# Patient Record
Sex: MALE | Race: WHITE | Employment: FULL TIME | URBAN - NONMETROPOLITAN AREA
[De-identification: names, ages, dates, MRNs, and addresses within clinical notes are randomized per-mention and may not be internally consistent; named-entity substitution may affect disease eponyms.]

---

## 2020-08-12 ENCOUNTER — APPOINTMENT (OUTPATIENT)
Dept: GENERAL RADIOLOGY | Age: 45
End: 2020-08-12
Payer: COMMERCIAL

## 2020-08-12 ENCOUNTER — HOSPITAL ENCOUNTER (EMERGENCY)
Age: 45
Discharge: HOME | End: 2020-08-13
Attending: INTERNAL MEDICINE
Payer: COMMERCIAL

## 2020-08-12 LAB
ABSOLUTE EOS #: 0.3 K/UL (ref 0–0.4)
ABSOLUTE IMMATURE GRANULOCYTE: NORMAL K/UL (ref 0–0.3)
ABSOLUTE LYMPH #: 3.6 K/UL (ref 1–4.8)
ABSOLUTE MONO #: 0.8 K/UL (ref 0–1)
ANION GAP SERPL CALCULATED.3IONS-SCNC: 17 MMOL/L (ref 9–17)
BASOPHILS # BLD: 0 % (ref 0–2)
BASOPHILS ABSOLUTE: 0 K/UL (ref 0–0.2)
BUN BLDV-MCNC: 13 MG/DL (ref 6–20)
BUN/CREAT BLD: 10 (ref 9–20)
CALCIUM SERPL-MCNC: 10.5 MG/DL (ref 8.6–10.4)
CHLORIDE BLD-SCNC: 99 MMOL/L (ref 98–107)
CO2: 22 MMOL/L (ref 20–31)
CREAT SERPL-MCNC: 1.3 MG/DL (ref 0.7–1.2)
DIFFERENTIAL TYPE: YES
EOSINOPHILS RELATIVE PERCENT: 3 % (ref 0–5)
GFR AFRICAN AMERICAN: >60 ML/MIN
GFR NON-AFRICAN AMERICAN: >60 ML/MIN
GFR SERPL CREATININE-BSD FRML MDRD: ABNORMAL ML/MIN/{1.73_M2}
GFR SERPL CREATININE-BSD FRML MDRD: ABNORMAL ML/MIN/{1.73_M2}
GLUCOSE BLD-MCNC: 138 MG/DL (ref 70–99)
HCT VFR BLD CALC: 41.4 % (ref 41–53)
HEMOGLOBIN: 13.5 G/DL (ref 13.5–17.5)
IMMATURE GRANULOCYTES: NORMAL %
LYMPHOCYTES # BLD: 39 % (ref 13–44)
MAGNESIUM: 2 MG/DL (ref 1.6–2.6)
MCH RBC QN AUTO: 26.2 PG (ref 26–34)
MCHC RBC AUTO-ENTMCNC: 32.6 G/DL (ref 31–37)
MCV RBC AUTO: 80.3 FL (ref 80–100)
MONOCYTES # BLD: 9 % (ref 5–9)
NRBC AUTOMATED: NORMAL PER 100 WBC
PDW BLD-RTO: 13.4 % (ref 12.1–15.2)
PLATELET # BLD: 272 K/UL (ref 140–450)
PLATELET ESTIMATE: NORMAL
PMV BLD AUTO: NORMAL FL (ref 6–12)
POTASSIUM SERPL-SCNC: 3.1 MMOL/L (ref 3.7–5.3)
RBC # BLD: 5.15 M/UL (ref 4.5–5.9)
RBC # BLD: NORMAL 10*6/UL
SEG NEUTROPHILS: 49 % (ref 39–75)
SEGMENTED NEUTROPHILS ABSOLUTE COUNT: 4.5 K/UL (ref 2.1–6.5)
SODIUM BLD-SCNC: 138 MMOL/L (ref 135–144)
WBC # BLD: 9.3 K/UL (ref 3.5–11)
WBC # BLD: NORMAL 10*3/UL

## 2020-08-12 PROCEDURE — 85025 COMPLETE CBC W/AUTO DIFF WBC: CPT

## 2020-08-12 PROCEDURE — 80048 BASIC METABOLIC PNL TOTAL CA: CPT

## 2020-08-12 PROCEDURE — 94770 HC ETCO2 MONITOR DAILY: CPT

## 2020-08-12 PROCEDURE — 99284 EMERGENCY DEPT VISIT MOD MDM: CPT

## 2020-08-12 PROCEDURE — 6360000002 HC RX W HCPCS

## 2020-08-12 PROCEDURE — 6360000002 HC RX W HCPCS: Performed by: INTERNAL MEDICINE

## 2020-08-12 PROCEDURE — 96375 TX/PRO/DX INJ NEW DRUG ADDON: CPT

## 2020-08-12 PROCEDURE — 23650 CLTX SHO DSLC W/MNPJ WO ANES: CPT

## 2020-08-12 PROCEDURE — 2580000003 HC RX 258: Performed by: INTERNAL MEDICINE

## 2020-08-12 PROCEDURE — 73030 X-RAY EXAM OF SHOULDER: CPT

## 2020-08-12 PROCEDURE — 83735 ASSAY OF MAGNESIUM: CPT

## 2020-08-12 PROCEDURE — 96374 THER/PROPH/DIAG INJ IV PUSH: CPT

## 2020-08-12 RX ORDER — FENTANYL CITRATE 50 UG/ML
300 INJECTION, SOLUTION INTRAMUSCULAR; INTRAVENOUS ONCE
Status: COMPLETED | OUTPATIENT
Start: 2020-08-12 | End: 2020-08-12

## 2020-08-12 RX ORDER — MIDAZOLAM HYDROCHLORIDE 1 MG/ML
2 INJECTION INTRAMUSCULAR; INTRAVENOUS ONCE
Status: COMPLETED | OUTPATIENT
Start: 2020-08-12 | End: 2020-08-12

## 2020-08-12 RX ORDER — FLUMAZENIL 0.1 MG/ML
INJECTION, SOLUTION INTRAVENOUS
Status: DISCONTINUED
Start: 2020-08-12 | End: 2020-08-12 | Stop reason: WASHOUT

## 2020-08-12 RX ORDER — MIDAZOLAM HYDROCHLORIDE 2 MG/2ML
INJECTION, SOLUTION INTRAMUSCULAR; INTRAVENOUS
Status: COMPLETED
Start: 2020-08-12 | End: 2020-08-12

## 2020-08-12 RX ORDER — ONDANSETRON 2 MG/ML
4 INJECTION INTRAMUSCULAR; INTRAVENOUS ONCE
Status: COMPLETED | OUTPATIENT
Start: 2020-08-12 | End: 2020-08-12

## 2020-08-12 RX ORDER — 0.9 % SODIUM CHLORIDE 0.9 %
1000 INTRAVENOUS SOLUTION INTRAVENOUS ONCE
Status: COMPLETED | OUTPATIENT
Start: 2020-08-12 | End: 2020-08-12

## 2020-08-12 RX ADMIN — FENTANYL CITRATE 100 MCG: 50 INJECTION INTRAMUSCULAR; INTRAVENOUS at 23:12

## 2020-08-12 RX ADMIN — MIDAZOLAM HYDROCHLORIDE 1 MG: 2 INJECTION, SOLUTION INTRAMUSCULAR; INTRAVENOUS at 23:12

## 2020-08-12 RX ADMIN — ONDANSETRON 4 MG: 2 INJECTION INTRAMUSCULAR; INTRAVENOUS at 21:51

## 2020-08-12 RX ADMIN — HYDROMORPHONE HYDROCHLORIDE 1 MG: 1 INJECTION, SOLUTION INTRAMUSCULAR; INTRAVENOUS; SUBCUTANEOUS at 21:51

## 2020-08-12 RX ADMIN — MIDAZOLAM HYDROCHLORIDE 1 MG: 2 INJECTION, SOLUTION INTRAMUSCULAR; INTRAVENOUS at 23:09

## 2020-08-12 RX ADMIN — SODIUM CHLORIDE 1000 ML: 9 INJECTION, SOLUTION INTRAVENOUS at 22:45

## 2020-08-12 ASSESSMENT — PAIN SCALES - GENERAL
PAINLEVEL_OUTOF10: 3
PAINLEVEL_OUTOF10: 8
PAINLEVEL_OUTOF10: 10
PAINLEVEL_OUTOF10: 6
PAINLEVEL_OUTOF10: 7
PAINLEVEL_OUTOF10: 7
PAINLEVEL_OUTOF10: 3

## 2020-08-12 ASSESSMENT — PAIN DESCRIPTION - LOCATION: LOCATION: SHOULDER

## 2020-08-12 ASSESSMENT — PAIN DESCRIPTION - DESCRIPTORS: DESCRIPTORS: CONSTANT;THROBBING

## 2020-08-12 ASSESSMENT — PAIN DESCRIPTION - PAIN TYPE: TYPE: ACUTE PAIN

## 2020-08-12 ASSESSMENT — PAIN DESCRIPTION - ORIENTATION: ORIENTATION: LEFT

## 2020-08-12 NOTE — LETTER
[facility] ED  [address] [place], [address]  Phone: [phone]               August 13, 2020    Patient: Jazzmine De La Fuente   YOB: 1975   Date of Visit: 8/12/2020       To Whom It May Concern:    Mc Ayers was seen and treated in our emergency department on 8/12/2020. He may return to work on 08/17/2020.       Sincerely,       Leonor Lockhart RN         Signature:__________________________________

## 2020-08-13 VITALS
WEIGHT: 204.7 LBS | OXYGEN SATURATION: 99 % | TEMPERATURE: 97.9 F | SYSTOLIC BLOOD PRESSURE: 116 MMHG | HEART RATE: 73 BPM | HEIGHT: 71 IN | DIASTOLIC BLOOD PRESSURE: 82 MMHG | RESPIRATION RATE: 12 BRPM | BODY MASS INDEX: 28.66 KG/M2

## 2020-08-13 PROCEDURE — 90471 IMMUNIZATION ADMIN: CPT | Performed by: INTERNAL MEDICINE

## 2020-08-13 PROCEDURE — 6360000002 HC RX W HCPCS: Performed by: INTERNAL MEDICINE

## 2020-08-13 PROCEDURE — 90715 TDAP VACCINE 7 YRS/> IM: CPT | Performed by: INTERNAL MEDICINE

## 2020-08-13 RX ORDER — TRAMADOL HYDROCHLORIDE 50 MG/1
50 TABLET ORAL EVERY 8 HOURS PRN
Qty: 8 TABLET | Refills: 0 | Status: SHIPPED | OUTPATIENT
Start: 2020-08-13 | End: 2020-08-16

## 2020-08-13 RX ORDER — IBUPROFEN 600 MG/1
600 TABLET ORAL 4 TIMES DAILY PRN
Qty: 40 TABLET | Refills: 0 | Status: SHIPPED | OUTPATIENT
Start: 2020-08-13

## 2020-08-13 RX ADMIN — TETANUS TOXOID, REDUCED DIPHTHERIA TOXOID AND ACELLULAR PERTUSSIS VACCINE, ADSORBED 0.5 ML: 5; 2.5; 8; 8; 2.5 SUSPENSION INTRAMUSCULAR at 00:21

## 2020-08-13 NOTE — ED PROVIDER NOTES
[facility] ED  EMERGENCY DEPARTMENT ENCOUNTER      Pt Name: Radha Wheat  MRN: [id number]  Birthdate 1975  Date of evaluation: 8/12/2020  Provider: Helen Verma MD    CHIEF COMPLAINT       Chief Complaint   Patient presents with    Shoulder Injury     fell off skateboard 30 min ago and is now having 8/10 pain         HISTORY OF PRESENT ILLNESS   (Location/Symptom, Timing/Onset, Context/Setting, Quality, Duration, Modifying Factors, Severity)  Note limiting factors. Radha Wheat is a 39 y.o. male who presents to the emergency department for evaluation and management of left shoulder pain after falling off a skateboard about 30 minutes ago. He is unable to move his left shoulder. He has not tried anything for symptoms. He has not seen any other providers for this. This patient is being evaluated during the COVID-19 pandemic. HPI    Nursing Notes were reviewed. REVIEW OF SYSTEMS    (2-9 systems for level 4, 10 or more for level 5)       REVIEW OF SYSTEMS    Constitutional: Negative for fatigue and fever. Respiratory: Negative for cough, chest tightness and shortness of breath. Cardiovascular: Negative for chest pain, palpitations and leg swelling. Musculoskeletal: Positive for left shoulder pain and deformity, negative for arthralgias, back pain and neck pain. Skin: Negative for wound, laceration, color change, pallor, rash   Neurological: Negative for dizziness, speech difficulty, weakness, distal tingling/numbness and headaches. All other systems reviewed and are negative. Except as noted above the remainder of the review of systems was reviewed and negative. PASTMEDICAL HISTORY   History reviewed. No pertinent past medical history.       SURGICAL HISTORY       Past Surgical History:   Procedure Laterality Date    BACK SURGERY  9/17/2012    fusion    HAND SURGERY Right 1995         CURRENT MEDICATIONS       Discharge Medication List as of 8/13/2020 12:43 AM      CONTINUE these medications which have NOT CHANGED    Details   acetaminophen (TYLENOL) 325 MG tablet Take 650 mg by mouth every 6 hours as needed for Pain. diphenhydrAMINE (BENADRYL) 25 MG tablet Take 50 mg by mouth every 6 hours as needed for Itching. ALLERGIES     Soybean-containing drug products    FAMILY HISTORY     History reviewed. No pertinent family history.        SOCIAL HISTORY       Social History     Socioeconomic History    Marital status:      Spouse name: None    Number of children: None    Years of education: None    Highest education level: None   Occupational History    None   Social Needs    Financial resource strain: None    Food insecurity     Worry: None     Inability: None    Transportation needs     Medical: None     Non-medical: None   Tobacco Use    Smoking status: Former Smoker    Smokeless tobacco: Never Used   Substance and Sexual Activity    Alcohol use: No    Drug use: Never    Sexual activity: Never   Lifestyle    Physical activity     Days per week: None     Minutes per session: None    Stress: None   Relationships    Social connections     Talks on phone: None     Gets together: None     Attends Orthodoxy service: None     Active member of club or organization: None     Attends meetings of clubs or organizations: None     Relationship status: None    Intimate partner violence     Fear of current or ex partner: None     Emotionally abused: None     Physically abused: None     Forced sexual activity: None   Other Topics Concern    None   Social History Narrative    None       SCREENINGS    [place] Coma Scale  Eye Opening: Spontaneous  Best Verbal Response: Oriented  Best Motor Response: Obeys commands  [place] Coma Scale Score: 15        PHYSICAL EXAM    (up to 7 for level 4, 8 or more for level 5)     ED Triage Vitals [08/12/20 2115]   BP Temp Temp src Pulse Resp SpO2 Height Weight   136/87 97.9 °F (36.6 °C) -- 83 16 100 % -- 204 lb 11.2 oz (92.9 kg)       Physical Exam  Physical Exam   Constitutional:  Appears well, well-developed and well-nourished. No distress noted. Non toxic in appearance. Patient in pain when shoulder is manipulated. HENT:     Head: Normocephalic and atraumatic. Mouth/Throat: Oropharynx is clear and mucosa moist. No oropharyngeal exudate noted. Posterior pharynx is pink and noninjected. Eyes: Conjunctivae and EOM are normal. Pupils are equal, round, and reactive to light. No scleral icterus. No tearing or drainage. Neck: Normal range of motion. Neck supple. No tracheal deviation present. No spinous tenderness or step-offs identified on palpation. There is no paraspinous tenderness. No neck pain with axial loading. Cardiovascular: Normal rate, regular rhythm, normal heart sounds and intact distal pulses including left finger cap refill and radial pulse. Exam reveals no gallop or friction rub. No murmur heard. Pulmonary/Chest: Effort normal and breath sounds are symmetric and normal. No respiratory distress. There are no wheezes, rales or rhonchi. No tenderness is exhibited upon palpation of the chest wall. Abdominal: Soft. Bowel sounds are normal. No distension or no mass exhibitted. There is no tenderness, rebound, rigidity or guarding. Genitourinary:   No CVA tenderness noted on examination. Musculoskeletal: Exam shows deformity of left shoulder c/w with anterior dislocation. Patient unable to perform ROM of left shoulder but normal ROM of left elbow, wrist and hand. Exam of spine shows No spinous tenderness or step-offs identified on palpation. No paraspinous tenderness. Lymphadenopathy:  No cervical adenopathy. Neurological:   Alert and oriented to person, place, and time. Reflexes are normal.  There are no cranial nerve deficits. Normal muscle tone, motor and sensory function exhibited of all extremities except left shoulder. Coordination and gait normal.   Skin: Skin is warm and dry.  No rash noted. No diaphoresis. No erythema. No pallor. Psychiatric:  normal mood and affect. Behavior is normal. Judgment and thought content normal.     DIAGNOSTIC RESULTS     EKG: All EKG's are interpreted by the Emergency Department Physician who either signs or Co-signs this chart in the absence of a cardiologist.    Not indicated. RADIOLOGY:   Non-plain film images such as CT, Ultrasoundand MRI are read by the radiologist. Darby Rosen radiographic images are visualized and preliminarily interpreted by the emergency physician with the below findings:    X-ray left shoulder: Anterior dislocation. No fracture identified  X-ray left shoulder: Post reduction, interval reduction of previous anterior dislocation. No fracture identified. Interpretation per the Radiologist below, if available at the time of this note:    XR SHOULDER LEFT (MIN 2 VIEWS)   Final Result      Reduction of previously noted anterior-inferior left shoulder dislocation. No    fracture. XR SHOULDER LEFT (MIN 2 VIEWS)   Final Result      Anterior glenohumeral dislocation                     ED BEDSIDE ULTRASOUND:   Performed by ED Physician - none    LABS:  Labs Reviewed   BASIC METABOLIC PANEL W/ REFLEX TO MG FOR LOW K - Abnormal; Notable for the following components:       Result Value    Glucose 138 (*)     CREATININE 1.30 (*)     Calcium 10.5 (*)     Potassium 3.1 (*)     All other components within normal limits   CBC WITH AUTO DIFFERENTIAL   MAGNESIUM       All other labs were within normal range or not returned as of this dictation.     EMERGENCY DEPARTMENT COURSE and DIFFERENTIAL DIAGNOSIS/MDM:   Vitals:    Vitals:    08/12/20 2331 08/12/20 2337 08/12/20 2345 08/12/20 2346   BP: 128/81 127/78 116/82 116/82   Pulse: 81 89 73    Resp: 16 16 12    Temp:       SpO2: 100% 100% 99% 99%   Weight:       Height:           Noted    MDM    CRITICAL CARE TIME   Total Critical Care time was 0 minutes    EDCOURSE CONSULTS:  None    PROCEDURES:  Unless otherwise noted below, none     Ortho Injury    Date/Time: 8/13/2020 12:04 AM  Performed by: Carmel Gibbs MD  Authorized by: Carmel Gibbs MD   Consent: Verbal consent obtained. Written consent obtained. Risks and benefits: risks, benefits and alternatives were discussed  Consent given by: patient and spouse  Patient understanding: patient states understanding of the procedure being performed  Patient consent: the patient's understanding of the procedure matches consent given  Procedure consent: procedure consent matches procedure scheduled  Relevant documents: relevant documents present and verified  Test results: test results available and properly labeled  Site marked: the operative site was not marked  Imaging studies: imaging studies available  Required items: required blood products, implants, devices, and special equipment available  Patient identity confirmed: verbally with patient, arm band, provided demographic data and hospital-assigned identification number  Time out: Immediately prior to procedure a \"time out\" was called to verify the correct patient, procedure, equipment, support staff and site/side marked as required. Injury location: shoulder  Location details: left shoulder  Injury type: dislocation  Dislocation type: anterior  Hill-Sachs deformity: no  Chronicity: new  Pre-procedure neurovascular assessment: neurovascularly intact  Pre-procedure distal perfusion: normal  Pre-procedure neurological function: normal  Pre-procedure range of motion: reduced    Anesthesia:  Local anesthesia used: no    Sedation:  Patient sedated: yes  Sedation type: moderate (conscious) sedation  Sedatives: midazolam and see MAR for details  Analgesia: fentanyl and see MAR for details  Sedation start date/time: 8/12/2020 11:00 PM  Sedation end date/time: 8/12/2020 11:25 PM  Vitals: Vital signs were monitored during sedation.     Manipulation performed: yes  Reduction method: traction and counter traction  Reduction successful: yes  X-ray confirmed reduction: yes  Immobilization: sling  Post-procedure neurovascular assessment: post-procedure neurovascularly intact  Post-procedure distal perfusion: normal  Post-procedure neurological function: normal  Post-procedure range of motion: normal  Patient tolerance: Patient tolerated the procedure well with no immediate complications  Comments: [place] Maneuver attempted initially followed by traction/counter-traction            Summation      Jose Maradiaga is a 39 y.o. male presented with anterior left shoulder dislocation which was successfully reduced and immobilized thereafter. He is well, well hydrated, nontoxic, hemodynamically stable, neurologically intact, and satisfactory for discharge for outpatient management. Findings discussed at length with patient. I instructed the patient to followup with orthopedics for evaluation of response to management of acute injury. I instructed the patient to return to the ER if his condition worsens, if there is any concern for altered mental status, difficulty breathing, dehydration or loss of function.         Patient Course:        ED Medicationsadministered this visit:    Medications   ondansetron (ZOFRAN) injection 4 mg (4 mg Intravenous Given 8/12/20 2151)   HYDROmorphone (DILAUDID) injection 1 mg (1 mg Intravenous Given 8/12/20 2151)   0.9 % sodium chloride bolus (0 mLs Intravenous Stopped 8/12/20 2358)   midazolam (VERSED) injection 2 mg (1 mg Intravenous Given 8/12/20 2309)   fentaNYL (SUBLIMAZE) injection 300 mcg (100 mcg Intravenous Given 8/12/20 2312)   midazolam PF (VERSED) 2 MG/2ML injection (1 mg  Given 8/12/20 2312)   Tetanus-Diphth-Acell Pertussis (BOOSTRIX) injection 0.5 mL (0.5 mLs Intramuscular Given 8/13/20 0021)       New Prescriptions from this visit:    Discharge Medication List as of 8/13/2020 12:43 AM      START taking these medications    Details traMADol (ULTRAM) 50 MG tablet Take 1 tablet by mouth every 8 hours as needed for Pain for up to 3 days. Intended supply: 3 days. Take lowest dose possible to manage breakthrough pain, Disp-8 tablet,R-0Print      ibuprofen (ADVIL;MOTRIN) 600 MG tablet Take 1 tablet by mouth 4 times daily as needed for Pain, Disp-40 tablet,R-0Print             Follow-up:  Rich Immanuel  [address] [place] [address],[address] [facility]  [phone]    Schedule an appointment as soon as possible for a visit in 1 week      [facility]  [address] [facility]  [phone]  Go to   As needed, If symptoms worsen    Karen De Los Santos MD  [address] [facility]  [phone]    Schedule an appointment as soon as possible for a visit in 2 days  call in the morning for Friday appointment        Final Impression:   1. Anterior dislocation of left shoulder, initial encounter    2. Fall, initial encounter    3. Need for Tdap vaccination    4. Abrasion of forearm without infection               (Please note that portions of this note werecompleted with a voice recognition program.  Efforts were made to edit the dictations but occasionally words are mis-transcribed.)    FINAL IMPRESSION      1. Anterior dislocation of left shoulder, initial encounter    2. Fall, initial encounter    3. Need for Tdap vaccination    4.  Abrasion of forearm without infection          DISPOSITION/PLAN   DISPOSITION        PATIENT REFERRED TO:  Alok Gambino  [address] [facility]  [phone]    Schedule an appointment as soon as possible for a visit in 1 week      [facility]  [address] [address] [phone]  Go to   As needed, If symptoms worsen    Karen De Los Santos MD  [address] [place] [address] [phone]    Schedule an appointment as soon as possible for a visit in 2 days  call in the morning for Friday appointment      DISCHARGE

## 2020-08-13 NOTE — ED NOTES
Respiratory therapist at bedside     Rupert Bills [facility]  08/12/20 8299

## 2020-08-13 NOTE — ED NOTES
Patient consented to a procedure to reduce the left shoulder and receive conscious sedation. Patient's spouse (Candice Jimenez) notified and consented to procedure as well.      Raquel Mao RN  08/12/20 9241

## 2020-09-08 ENCOUNTER — HOSPITAL ENCOUNTER (OUTPATIENT)
Dept: MRI IMAGING | Age: 45
Discharge: HOME | End: 2020-09-10
Payer: COMMERCIAL

## 2020-09-08 PROCEDURE — 73221 MRI JOINT UPR EXTREM W/O DYE: CPT

## 2020-09-21 ENCOUNTER — HOSPITAL ENCOUNTER (OUTPATIENT)
Dept: PHYSICAL THERAPY | Age: 45
Setting detail: THERAPIES SERIES
Discharge: HOME | End: 2020-09-21
Payer: COMMERCIAL

## 2020-09-21 PROCEDURE — 97161 PT EVAL LOW COMPLEX 20 MIN: CPT

## 2020-09-21 ASSESSMENT — PAIN SCALES - GENERAL: PAINLEVEL_OUTOF10: 2

## 2020-09-21 NOTE — PLAN OF CARE
[facility] JULIANNE ROSA       Phone: [phone]   Date: 2020                      Outpatient Physical Therapy  Fax: [phone]    ACCT #: [de-identified]                     Plan of Care  [facility]#: [id number]  Patient: José Miguel Stephen  : 1975    Referring Practitioner: Dr. Jaimie Love    Referral Date : 20    Diagnosis: Left shoulder dislocation  Onset Date: 20  Treatment Diagnosis: Left shoulder dislocation      Assessment: Patient fell sustaining left shoulder dislocation and Bankart fracture/Hillsach lesion. He would benefit from short term therapy to educate and proges with scapular and rotator/GH strengthening to return to fucntional activities and prevent dislocation  Prognosis: Good    Treatment Plan :  Days: 2 times per week Weeks: 6 weeks Total # of Visits Approved: 30    Patient Education/HEP and Therapeutic Exercise     Goals: Time Frame for Short term goals: 6 visits  Short term goal 1: Educate on home program for strengthening of scapular musc and GH musc     Time Frame for Long term goals : 12 visits  Long term goal 1: AROM WNL to complete daily household and work tasks reaching overhead  Long term goal 2: Strength of [facility] and rotator cuff musc 4+-5/5 to complete lifitng up to 40# for work duties  Long term goal 3: Fucntional IR to reach behind back and complete self-care/dressing without discomfort     GINGER KIMBALL, PT   Date: 2020    ______________________________________ Date: 2020  Physician Signature  By signing above or cosigning electronically, I have reviewed this Plan of Care and certify a need for medically necessary rehabilitation services.

## 2020-09-21 NOTE — PROGRESS NOTES
Phone: [address] [place]         Fax: [phone]                      Outpatient Physical Therapy                                                                      Evaluation    Date: 2020  Patient: Kim Alcazar  : 1975  ACCT #: [de-identified]    Referring Practitioner: Dr. Juan Barnes    Referral Date : 20    Diagnosis: Left shoulder dislocation    Treatment Diagnosis: Left shoulder dislocation  Onset Date: 20  PT Insurance Information: CRS  Total # of Visits Approved: 30 Per Physician Order  Total # of Visits to Date: 1  No Show: 0  Canceled Appointment: 0     Subjective ; Goes by Glass     Additional Pertinent Hx: Patient reports falling off skateboard landing on outstretches left UE. He sustained an anterior dislocation with Bankart fracture and Hillsachs lesion. No surgery warrented. Patient placed in sling x 3-4 weeks; he has returned to light duty work. He reports minimal pain and uses Tylenol 1-2 per day. Return MD in 1 month. PMHx includes L5/S1 fusion. 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; 9 still at home    Objective  Vision  Vision: Within Functional Limits  Hearing  Hearing: Within functional limits  Observation/Palpation  Posture: Fair(mild forward shoulder)  Palpation: Mild tenderness     Strength LUE  Strength LUE: Exception  L Shoulder Flexion: 4/5  L Shoulder ABduction: 4/5  L Shoulder External Rotation: 4/5    AROM LUE (degrees)  LUE AROM : Exceptions  L Shoulder Flexion 0-180: 150 deg  L Shoulder ABduction 0-180: 150 deg  L Shoulder Int Rotation  0-70:  Fucntionally able to reach belt line with mild discomfort  L Shoulder Ext Rotation  0-90: Fucntionally able to reach base of head     PROM LUE (degrees)  LUE PROM: Exceptions  L Shoulder Flex  0-170: 155 deg  L Shoulder Ext  0-45: 150 deg  L Shoulder Int Rotation  0-70: 55 deg  L Shoulder Ext Rotation  0-90: 60 deg                                     Assessment  Assessment: Patient fell sustaining left shoulder dislocation and Bankart fracture/Hillsach lesion.    He would benefit from short term therapy to educate and proges with scapular and rotator/GH strengthening to return to fucntional activities and prevent dislocation  Prognosis: Good  Decision Making: Low Complexity  Exam: UEFS = 69/80    Clinical Presentation:  Stable/Uncomplicated  The Following Comorbities will impact the patients progression and Plan of Care:   Previous Orthopedic Injury/Surgery       Activity Tolerance: Patient Tolerated treatment well    Education: PT POC; precautions;   HEP of tband          Goals  Short term goals  Time Frame for Short term goals: 6 visits  Short term goal 1: Educate on home program for strengthening of scapular musc and GH musc     Long term goals  Time Frame for Long term goals : 12 visits  Long term goal 1: AROM WNL to complete daily household and work tasks reaching overhead  Long term goal 2: Strength of GH and rotator cuff musc 4+-5/5 to complete lifitng up to 40# for work duties  Long term goal 3: Fucntional IR to reach behind back and complete self-care/dressing without discomfort    Patient's Goal:    Get back to [facility]    Timed Code Treatment Minutes: 0 Minutes  Total Treatment Time: 50     Time In: 1115  Time Out: 80    [address] [facility], PT Date: 9/21/2020

## 2020-09-25 ENCOUNTER — HOSPITAL ENCOUNTER (OUTPATIENT)
Dept: PHYSICAL THERAPY | Age: 45
Setting detail: THERAPIES SERIES
Discharge: HOME | End: 2020-09-25
Payer: COMMERCIAL

## 2020-09-25 PROCEDURE — 97110 THERAPEUTIC EXERCISES: CPT

## 2020-09-25 NOTE — PROGRESS NOTES
Phone: [address]      Fax: [phone]                            Outpatient Physical Therapy                                                                            Daily Note    Date: 2020  Patient Name: Ender Diamond        MRN: [id number]   ACCT#:  [de-identified]  : 1975  (39 y.o.)    Referring Practitioner: Dr. Armaan Quijano    Referral Date : 20    Diagnosis: Left shoulder dislocation  Treatment Diagnosis: Left shoulder dislocation    Onset Date: 20  PT Insurance Information: CRS  Total # of Visits Approved: 30 Per Physician Order  Total # of Visits to Date: 2  No Show: 0  Canceled Appointment: 0  Plan of Care/Certification Expiration Date: 20    Pre-Treatment Pain:  0/10     Assessment  Assessment: Pt is compliant with HEP. Had mild soreness after initial visit. Initiated ex as outlined with estephania robbins. Will cont per plan.    Chart Reviewed: Yes    Plan  Plan: Continue with current plan    Exercises/Modalities/Manual:  See DocFlow Sheet              Goals  (Total # of Visits to Date: 2)   Short Term Goals - Time Frame for Short term goals: 6 visits  Short term goal 1: Educate on home program for strengthening of scapular musc and [address] musc           Long Term Goals - Time Frame for Long term goals : 12 visits  Long term goal 1: AROM WNL to complete daily household and work tasks reaching overhead  Long term goal 2: Strength of [address] and rotator cuff musc 4+-5/5 to complete lifitng up to 40# for work duties  Long term goal 3: Fucntional IR to reach behind back and complete self-care/dressing without discomfort          Post Treatment Pain:  0/10    Time In: 0730    Time Out : 0705        Timed and total 35 min    Vidal Hernandez   PTA  Date: 2020

## 2020-09-29 ENCOUNTER — HOSPITAL ENCOUNTER (OUTPATIENT)
Dept: PHYSICAL THERAPY | Age: 45
Setting detail: THERAPIES SERIES
Discharge: HOME | End: 2020-09-29
Payer: COMMERCIAL

## 2020-09-29 PROCEDURE — 97110 THERAPEUTIC EXERCISES: CPT

## 2020-09-29 NOTE — PROGRESS NOTES
Phone: [address]      Fax: [phone]                            Outpatient Physical Therapy                                                                            Daily Note    Date: 2020  Patient Name: Kim Alcazar        MRN: [id number]   ACCT#:  [de-identified]  : 1975  (39 y.o.)    Referring Practitioner: Dr. Juan Barnes    Referral Date : 20    Diagnosis: Left shoulder dislocation  Treatment Diagnosis: Left shoulder dislocation    Onset Date: 20  PT Insurance Information: CRS  Total # of Visits Approved: 30 Per Physician Order  Total # of Visits to Date: 3  No Show: 0  Canceled Appointment: 0  Plan of Care/Certification Expiration Date: 20    Pre-Treatment Pain:  0-1/10     Assessment  Assessment: Patient notes generalized fatigue with daily activities as well as with exercise. Difficulty with sidelying ER and hor abd. Educated to increase reps with HEP for endurance.    Continue to progress with scapular and [address] strengthening advancing to plyometric throws  Chart Reviewed: Yes    Plan  Plan: Continue with current plan    Exercises/Modalities/Manual:  See DocFlow Sheet    Education: Increase reps with HEP          Goals  (Total # of Visits to Date: 3)   Short Term Goals - Time Frame for Short term goals: 6 visits  Short term goal 1: Educate on home program for strengthening of scapular musc and GH musc - MET                Long Term Goals - Time Frame for Long term goals : 12 visits  Long term goal 1: AROM WNL to complete daily household and work tasks reaching overhead  Long term goal 2: Strength of [address] and rotator cuff musc 4+-5/5 to complete lifitng up to 40# for work duties  Long term goal 3: Fucntional IR to reach behind back and complete self-care/dressing without discomfort          Post Treatment Pain:  1-2/10    Time In: 0800    Time Out : 0838        Timed Code Treatment Minutes: 35 Minutes  Total Treatment Time: 38 Freddie KIMBALL, PT     Date: 9/29/2020

## 2020-10-02 ENCOUNTER — HOSPITAL ENCOUNTER (OUTPATIENT)
Dept: PHYSICAL THERAPY | Age: 45
Setting detail: THERAPIES SERIES
Discharge: HOME | End: 2020-10-02
Payer: COMMERCIAL

## 2020-10-02 PROCEDURE — 97110 THERAPEUTIC EXERCISES: CPT

## 2020-10-02 NOTE — PROGRESS NOTES
Phone: [address]      Fax: [phone]                            Outpatient Physical Therapy                                                                            Daily Note    Date: 10/2/2020  Patient Name: Yogi Mars        MRN: [id number]   ACCT#:  [de-identified]  : 1975  (39 y.o.)    Referring Practitioner: Dr. Naveed Mojica    Referral Date : 20    Diagnosis: Left shoulder dislocation  Treatment Diagnosis: Left shoulder dislocation    Onset Date: 20  PT Insurance Information: CRS  Total # of Visits Approved: 30 Per Physician Order  Total # of Visits to Date: 4  No Show: 0  Canceled Appointment: 0  Plan of Care/Certification Expiration Date: 20    Pre-Treatment Pain:  0/10     Assessment  Assessment: Overall patient reports minimal discomfort except with reaching overhead activities and he did note pain when sleeping on affected side. Able to complete increased reps with exercise before fatigued. End range tightness with flexion and abduction.   Progress with strengthening  Chart Reviewed: Yes    Plan  Plan: Continue with current plan    Exercises/Modalities/Manual:  See DocFlow Sheet    Education:           Goals  (Total # of Visits to Date: 4)   Short Term Goals - Time Frame for Short term goals: 6 visits  Short term goal 1: Educate on home program for strengthening of scapular musc and GH musc                 Long Term Goals - Time Frame for Long term goals : 12 visits  Long term goal 1: AROM WNL to complete daily household and work tasks reaching overhead  Long term goal 2: Strength of [facility] and rotator cuff musc 4+-5/5 to complete lifitng up to 40# for work duties  Long term goal 3: Fucntional IR to reach behind back and complete self-care/dressing without discomfort          Post Treatment Pain:  0-1/10    Time In: 0803    Time Out : 08        Timed Code Treatment Minutes: 40 Minutes  Total Treatment Time: [address], PT     Date: 10/2/2020

## 2020-10-06 ENCOUNTER — HOSPITAL ENCOUNTER (OUTPATIENT)
Dept: PHYSICAL THERAPY | Age: 45
Setting detail: THERAPIES SERIES
Discharge: HOME | End: 2020-10-06
Payer: COMMERCIAL

## 2020-10-06 PROCEDURE — 97110 THERAPEUTIC EXERCISES: CPT

## 2020-10-06 ASSESSMENT — PAIN SCALES - GENERAL: PAINLEVEL_OUTOF10: 1

## 2020-10-06 NOTE — PROGRESS NOTES
Phone: [address]      Fax: [phone]                            Outpatient Physical Therapy                                                                            Daily Note    Date: 10/6/2020  Patient Name: Tammie Guzman        MRN: [id number]   ACCT#:  [de-identified]  : 1975  (39 y.o.)    Referring Practitioner: Dr. Russ Contreras    Referral Date : 20    Diagnosis: Left shoulder dislocation  Treatment Diagnosis: Left shoulder dislocation    Onset Date: 20  PT Insurance Information: CRS  Total # of Visits Approved: 12 Per Physician Order  Total # of Visits to Date: 5  No Show: 0  Canceled Appointment: 0  Plan of Care/Certification Expiration Date: 20    Pre-Treatment Pain:  1/10     Assessment  Assessment: Pt rates soreness 1/10. He has good compliance with HEP. Performed ex as outlined, progressed reps of hoist exs. Shoulder ER and IR /5 with mmt.   Chart Reviewed: Yes    Plan  Plan: Continue with current plan    Exercises/Modalities/Manual:  See DocFlow Sheet          Goals  (Total # of Visits to Date: 5)   Short Term Goals - Time Frame for Short term goals: 6 visits  Short term goal 1: Educate on home program for strengthening of scapular musc and [address] musc -met          Long Term Goals - Time Frame for Long term goals : 12 visits  Long term goal 1: AROM WNL to complete daily household and work tasks reaching overhead  Long term goal 2: Strength of [address] and rotator cuff musc 4+-5/5 to complete lifitng up to 40# for work duties  Long term goal 3: Fucntional IR to reach behind back and complete self-care/dressing without discomfort          Post Treatment Pain:  1/10    Time In: 0730    Time Out : 0805        Timed Code Treatment Minutes: 35 Minutes  Total Treatment Time: 35 Minutes    Rachana Hernandez  PTA   Date: 10/6/2020

## 2020-10-09 ENCOUNTER — HOSPITAL ENCOUNTER (OUTPATIENT)
Dept: PHYSICAL THERAPY | Age: 45
Setting detail: THERAPIES SERIES
Discharge: HOME | End: 2020-10-09
Payer: COMMERCIAL

## 2020-10-09 PROCEDURE — 97110 THERAPEUTIC EXERCISES: CPT

## 2020-10-09 ASSESSMENT — PAIN SCALES - GENERAL: PAINLEVEL_OUTOF10: 1

## 2020-10-09 NOTE — PROGRESS NOTES
Phone: [address]      Fax: [phone]                            Outpatient Physical Therapy                                                                            Daily Note    Date: 10/9/2020  Patient Name: Lucian Cho        MRN: [id number]   ACCT#:  [de-identified]  : 1975  (39 y.o.)    Referring Practitioner: Dr. Elvira Carter    Referral Date : 20    Diagnosis: Left shoulder dislocation  Treatment Diagnosis: Left shoulder dislocation    Onset Date: 20  PT Insurance Information: CRS  Total # of Visits Approved: 12 Per Physician Order  Total # of Visits to Date: 6  No Show: 0  Canceled Appointment: 0  Plan of Care/Certification Expiration Date: 20    Pre-Treatment Pain:  1/10     Assessment  Assessment: Patient notes soreness with reaching activities. AROM WFL with end range tightness. Strength improving. Plan to progress into diagonal ranges and overhead strengthening ( ER @ 90/90) as rosendo. Plan to continue x 2 weeks.   Patient to contact MD for follow up appt  Chart Reviewed: Yes    Plan  Plan: Continue with current plan    Exercises/Modalities/Manual:  See DocFlow Sheet    Education:           Goals  (Total # of Visits to Date: 6)   Short Term Goals - Time Frame for Short term goals: 6 visits  Short term goal 1: Educate on home program for strengthening of scapular musc and GH musc -met                Long Term Goals - Time Frame for Long term goals : 12 visits  Long term goal 1: AROM WNL to complete daily household and work tasks reaching overhead  Long term goal 2: Strength of [address] and rotator cuff musc 4+-5/5 to complete lifitng up to 40# for work duties  Long term goal 3: Fucntional IR to reach behind back and complete self-care/dressing without discomfort          Post Treatment Pain:  1-2/10    Time In: 0800    Time Out : 0845        Timed Code Treatment Minutes: 45 Minutes  Total Treatment Time: [address], PT Date: 10/9/2020

## 2020-10-13 ENCOUNTER — HOSPITAL ENCOUNTER (OUTPATIENT)
Dept: PHYSICAL THERAPY | Age: 45
Setting detail: THERAPIES SERIES
Discharge: HOME | End: 2020-10-13
Payer: COMMERCIAL

## 2020-10-13 PROCEDURE — 97110 THERAPEUTIC EXERCISES: CPT

## 2020-10-16 ENCOUNTER — HOSPITAL ENCOUNTER (OUTPATIENT)
Dept: PHYSICAL THERAPY | Age: 45
Setting detail: THERAPIES SERIES
Discharge: HOME | End: 2020-10-16
Payer: COMMERCIAL

## 2020-10-16 PROCEDURE — 97110 THERAPEUTIC EXERCISES: CPT

## 2020-10-16 NOTE — PROGRESS NOTES
Phone: [address]      Fax: [phone]                            Outpatient Physical Therapy                                                                            Daily Note    Date: 10/16/2020  Patient Name: Sarmad Forte        MRN: [id number]   ACCT#:  [de-identified]  : 1975  (39 y.o.)    Referring Practitioner: Dr. Yanira Tesfaye    Referral Date : 20    Diagnosis: Left shoulder dislocation  Treatment Diagnosis: Left shoulder dislocation    Onset Date: 20  PT Insurance Information: CRS  Total # of Visits Approved: 12 Per Physician Order  Total # of Visits to Date: 8  No Show: 0  Canceled Appointment: 0  Plan of Care/Certification Expiration Date: 20    Pre-Treatment Pain:  0/10     Assessment  Assessment: Pt a few min late for appt. Pt reports no pain. He reports good rosendo to sessions. He has not called his Dr yet. Performed ex  as outlined for strengthening of shoulder. Plan to cont x 1 more week.    Chart Reviewed: Yes    Plan  Plan: Continue with current plan    Exercises/Modalities/Manual:  See DocFlow Sheet        Goals  (Total # of Visits to Date: 8)   Short Term Goals - Time Frame for Short term goals: 6 visits  Short term goal 1: Educate on home program for strengthening of scapular musc and [address] musc -met       Long Term Goals - Time Frame for Long term goals : 12 visits  Long term goal 1: AROM WNL to complete daily household and work tasks reaching overhead  Long term goal 2: Strength of [address] and rotator cuff musc 4+-5/5 to complete lifitng up to 40# for work duties  Long term goal 3: Fucntional IR to reach behind back and complete self-care/dressing without discomfort          Post Treatment Pain:  0/10    Time In: 5197  Time Out : 0810        Timed Code Treatment Minutes: 36 Minutes  Total Treatment Time: 36 Minutes    Ar Hernandez PTA     Date: 10/16/2020

## 2020-10-20 ENCOUNTER — HOSPITAL ENCOUNTER (OUTPATIENT)
Dept: PHYSICAL THERAPY | Age: 45
Setting detail: THERAPIES SERIES
Discharge: HOME | End: 2020-10-20
Payer: COMMERCIAL

## 2020-10-20 PROCEDURE — 97110 THERAPEUTIC EXERCISES: CPT

## 2020-10-20 NOTE — PROGRESS NOTES
Phone: [address]      Fax: [phone]                            Outpatient Physical Therapy                                                                            Daily Note    Date: 10/20/2020  Patient Name: Maria Elena Baker        MRN: [id number]   ACCT#:  [de-identified]  : 1975  (39 y.o.)    Referring Practitioner: Dr. Boom Gonzalez    Referral Date : 20    Diagnosis: Left shoulder dislocation  Treatment Diagnosis: Left shoulder dislocation    Onset Date: 20  PT Insurance Information: CRS  Total # of Visits Approved: 12 Per Physician Order  Total # of Visits to Date: 9  No Show: 0  Canceled Appointment: 0  Plan of Care/Certification Expiration Date: 20    Pre-Treatment Pain:  0/10     Assessment  Assessment: Pt able to lift 40# crate in clinic with no pain. He notes mild discomfort with putting on a shirt or coat. He can lift his baby over his head without difficulty. Anticipate DC next visit.    Chart Reviewed: Yes    Plan  Plan: Continue with current plan    Exercises/Modalities/Manual:  See DocFlow Sheet            Goals  (Total # of Visits to Date: 5)   Short Term Goals - Time Frame for Short term goals: 6 visits  Short term goal 1: Educate on home program for strengthening of scapular musc and [address] musc -met       Long Term Goals - Time Frame for Long term goals : 12 visits  Long term goal 1: AROM WNL to complete daily household and work tasks reaching [address] [facility] term goal 2: Strength of [address] and rotator cuff musc 4+-5/5 to complete lifitng up to 40# for work duties  Long term goal 3: Fucntional IR to reach behind back and complete self-care/dressing without discomfort-not met          Post Treatment Pain:  0/10    Time In: 0730    Time Out : 0810        Timed Code Treatment Minutes: 40 Minutes  Total Treatment Time: 40 Minutes    Arlene Hernandez  PTA   Date: 10/20/2020

## 2020-10-23 ENCOUNTER — HOSPITAL ENCOUNTER (OUTPATIENT)
Dept: PHYSICAL THERAPY | Age: 45
Setting detail: THERAPIES SERIES
Discharge: HOME | End: 2020-10-23
Payer: COMMERCIAL

## 2020-10-23 PROCEDURE — 97110 THERAPEUTIC EXERCISES: CPT

## 2020-10-23 NOTE — PROGRESS NOTES
Phone: [address]      Fax: [phone]                            Outpatient Physical Therapy                                                                            Daily Note    Date: 10/23/2020  Patient Name: Edda Farah        MRN: [id number]   ACCT#:  [de-identified]  : 1975  (39 y.o.)    Referring Practitioner: Dr. Delfina Og    Referral Date : 20    Diagnosis: Left shoulder dislocation  Treatment Diagnosis: Left shoulder dislocation    Onset Date: 20  PT Insurance Information: CRS  Total # of Visits Approved: 12 Per Physician Order  Total # of Visits to Date: 10  No Show: 0  Canceled Appointment: 0  Plan of Care/Certification Expiration Date: 20    Pre-Treatment Pain:  0/10     Assessment  Assessment: Pt seen for final visit. He has good understanding of HEP. Lifting 40# in clinic with no pain. Strength 4+/5 glenohumeral joint. DC at this time.   Chart Reviewed: Yes    Plan  Plan: Discharge    Exercises/Modalities/Manual:  See DocFlow Sheet          Goals  (Total # of Visits to Date: 8)   Short Term Goals - Time Frame for Short term goals: 6 visits  Short term goal 1: Educate on home program for strengthening of scapular musc and [address] musc -met       Long Term Goals - Time Frame for Long term goals : 12 visits  Long term goal 1: AROM WNL to complete daily household and work tasks reaching [address] [facility] term goal 2: Strength of [address] and rotator cuff musc 4+-5/5 to complete lifitng up to 40# for work [employer]  Long term goal 3: Fucntional IR to reach behind back and complete self-care/dressing without discomfort-not met          Post Treatment Pain:  0/10    Time In: 0732    Time Out : 0812        Timed Code Treatment Minutes: 40 Minutes  Total Treatment Time: 40 Minutes    Irish Hernandez   PTA  Date: 10/23/2020

## 2021-08-06 ENCOUNTER — HOSPITAL ENCOUNTER (EMERGENCY)
Age: 46
Discharge: HOME | End: 2021-08-06
Attending: EMERGENCY MEDICINE
Payer: COMMERCIAL

## 2021-08-06 VITALS
WEIGHT: 207 LBS | DIASTOLIC BLOOD PRESSURE: 105 MMHG | OXYGEN SATURATION: 99 % | SYSTOLIC BLOOD PRESSURE: 164 MMHG | HEART RATE: 85 BPM | HEIGHT: 71 IN | TEMPERATURE: 98.6 F | BODY MASS INDEX: 28.98 KG/M2 | RESPIRATION RATE: 16 BRPM

## 2021-08-06 DIAGNOSIS — K08.89 PAIN, DENTAL: Primary | ICD-10-CM

## 2021-08-06 PROCEDURE — 6370000000 HC RX 637 (ALT 250 FOR IP)

## 2021-08-06 PROCEDURE — 99282 EMERGENCY DEPT VISIT SF MDM: CPT

## 2021-08-06 PROCEDURE — 6370000000 HC RX 637 (ALT 250 FOR IP): Performed by: EMERGENCY MEDICINE

## 2021-08-06 RX ORDER — LIDOCAINE HYDROCHLORIDE 20 MG/ML
15 SOLUTION OROPHARYNGEAL
Status: DISCONTINUED | OUTPATIENT
Start: 2021-08-06 | End: 2021-08-06 | Stop reason: HOSPADM

## 2021-08-06 RX ORDER — PENICILLIN V POTASSIUM 500 MG/1
500 TABLET ORAL 4 TIMES DAILY
Qty: 40 TABLET | Refills: 0 | Status: SHIPPED | OUTPATIENT
Start: 2021-08-06 | End: 2021-08-16

## 2021-08-06 RX ORDER — PENICILLIN V POTASSIUM 250 MG/1
500 TABLET ORAL ONCE
Status: COMPLETED | OUTPATIENT
Start: 2021-08-06 | End: 2021-08-06

## 2021-08-06 RX ORDER — KETOROLAC TROMETHAMINE 10 MG/1
10 TABLET, FILM COATED ORAL EVERY 8 HOURS PRN
Qty: 15 TABLET | Refills: 0 | Status: SHIPPED | OUTPATIENT
Start: 2021-08-06

## 2021-08-06 RX ADMIN — BENZOCAINE: 200 GEL DENTAL; ORAL; PERIODONTAL at 19:46

## 2021-08-06 RX ADMIN — PENICILLIN V POTASSIUM 500 MG: 250 TABLET ORAL at 19:46

## 2021-08-06 RX ADMIN — LIDOCAINE HYDROCHLORIDE 15 ML: 20 SOLUTION ORAL; TOPICAL at 19:46

## 2021-08-06 ASSESSMENT — PAIN DESCRIPTION - ORIENTATION: ORIENTATION: RIGHT

## 2021-08-06 ASSESSMENT — PAIN DESCRIPTION - FREQUENCY: FREQUENCY: CONTINUOUS

## 2021-08-06 ASSESSMENT — PAIN DESCRIPTION - DESCRIPTORS: DESCRIPTORS: THROBBING

## 2021-08-06 ASSESSMENT — PAIN SCALES - GENERAL: PAINLEVEL_OUTOF10: 9

## 2021-08-06 ASSESSMENT — PAIN DESCRIPTION - PAIN TYPE: TYPE: ACUTE PAIN

## 2021-08-06 ASSESSMENT — PAIN DESCRIPTION - LOCATION: LOCATION: MOUTH

## 2021-08-06 NOTE — ED PROVIDER NOTES
HPI:  8/6/21,   Time: 7:28 PM EDT         Yogi Mars is a 39 y.o. male presenting to the ED for right lower dental pain, beginning last few days ago. The complaint has been constant, moderate in severity, and worsened by food and chewing. No fever chills or night sweats. No injury    ROS:   Pertinent positives and negatives are stated within HPI, all other systems reviewed and are negative.  --------------------------------------------- PAST HISTORY ---------------------------------------------  Past Medical History:  has no past medical history on file. Past Surgical History:  has a past surgical history that includes back surgery (9/17/2012) and Hand surgery (Right, 1995). Social History:  reports that he has quit smoking. He has never used smokeless tobacco. He reports that he does not drink alcohol and does not use drugs. Family History: family history is not on file. The patients home medications have been reviewed. Allergies: Soybean-containing drug products    -------------------------------------------------- RESULTS -------------------------------------------------  All laboratory and radiology results have been personally reviewed by myself   LABS:  No results found for this visit on 08/06/21. RADIOLOGY:  Interpreted by Radiologist.  No orders to display       ------------------------- NURSING NOTES AND VITALS REVIEWED ---------------------------   The nursing notes within the ED encounter and vital signs as below have been reviewed.    BP (!) 164/105   Pulse 85   Temp 98.6 °F (37 °C) (Oral)   Resp 16   Ht 5' 11\" (1.803 m)   Wt 207 lb (93.9 kg)   SpO2 99%   BMI 28.87 kg/m²   Oxygen Saturation Interpretation: Normal      ---------------------------------------------------PHYSICAL EXAM--------------------------------------      Constitutional/General: Alert and oriented x3, well appearing, non toxic in NAD  Head: NC/AT  Eyes: PERRL, EOMI  Mouth: Oropharynx clear, handling secretions, no trismus, right lower molars nearly gone with percussion tenderness  Neck: Supple, full ROM, no meningeal signs  Pulmonary: Lungs clear to auscultation bilaterally, no wheezes, rales, or rhonchi. Not in respiratory distress  Cardiovascular:  Regular rate and rhythm, no murmurs, gallops, or rubs. 2+ distal pulses  Abdomen: Soft, non tender, non distended,   Extremities: Moves all extremities x 4. Warm and well perfused  Skin: warm and dry without rash  Neurologic: GCS 15,  Psych: Normal Affect      ------------------------------ ED COURSE/MEDICAL DECISION MAKING----------------------  Medications   lidocaine viscous hcl (XYLOCAINE) 2 % solution 15 mL (has no administration in time range)   butamben-tetracaine-benzocaine (CETACAINE) spray 10 spray (has no administration in time range)   penicillin v potassium (VEETID) tablet 500 mg (has no administration in time range)         Medical Decision Making:    Dental pain/possible abscess    Counseling: The emergency provider has spoken with the patient and discussed todays results, in addition to providing specific details for the plan of care and counseling regarding the diagnosis and prognosis. Questions are answered at this time and they are agreeable with the plan.      --------------------------------- IMPRESSION AND DISPOSITION ---------------------------------    IMPRESSION  1.  Pain, dental New Problem       DISPOSITION  Disposition: Discharge to home  Patient condition is stable                  Martinez Adames MD  08/06/21 1929

## 2021-08-08 ENCOUNTER — HOSPITAL ENCOUNTER (EMERGENCY)
Age: 46
Discharge: HOME | End: 2021-08-08
Attending: FAMILY MEDICINE
Payer: COMMERCIAL

## 2021-08-08 VITALS
WEIGHT: 205 LBS | OXYGEN SATURATION: 99 % | TEMPERATURE: 97.9 F | SYSTOLIC BLOOD PRESSURE: 160 MMHG | RESPIRATION RATE: 18 BRPM | HEART RATE: 96 BPM | DIASTOLIC BLOOD PRESSURE: 87 MMHG | BODY MASS INDEX: 28.59 KG/M2

## 2021-08-08 DIAGNOSIS — R22.0 RIGHT FACIAL SWELLING: Primary | ICD-10-CM

## 2021-08-08 DIAGNOSIS — K08.89 PAIN, DENTAL: ICD-10-CM

## 2021-08-08 PROCEDURE — 99283 EMERGENCY DEPT VISIT LOW MDM: CPT

## 2021-08-08 RX ORDER — HYDROCODONE BITARTRATE AND ACETAMINOPHEN 5; 325 MG/1; MG/1
1 TABLET ORAL EVERY 6 HOURS PRN
Qty: 12 TABLET | Refills: 0 | Status: SHIPPED | OUTPATIENT
Start: 2021-08-08 | End: 2021-08-11

## 2021-08-08 RX ORDER — IBUPROFEN 800 MG/1
800 TABLET ORAL EVERY 8 HOURS PRN
Qty: 30 TABLET | Refills: 1 | Status: SHIPPED | OUTPATIENT
Start: 2021-08-08

## 2021-08-08 ASSESSMENT — PAIN SCALES - GENERAL: PAINLEVEL_OUTOF10: 7

## 2021-08-08 ASSESSMENT — PAIN DESCRIPTION - LOCATION: LOCATION: TEETH;JAW

## 2021-08-08 ASSESSMENT — ENCOUNTER SYMPTOMS
FACIAL SWELLING: 1
TROUBLE SWALLOWING: 0

## 2021-08-08 ASSESSMENT — PAIN DESCRIPTION - DESCRIPTORS: DESCRIPTORS: ACHING

## 2021-08-08 ASSESSMENT — PAIN DESCRIPTION - FREQUENCY: FREQUENCY: CONTINUOUS

## 2021-08-08 ASSESSMENT — PAIN DESCRIPTION - PAIN TYPE: TYPE: ACUTE PAIN

## 2021-08-08 NOTE — ED PROVIDER NOTES
[address] [facility]  eMERGENCY dEPARTMENT eNCOUnter          [address] [facility]       Chief Complaint   Patient presents with    Dental Pain     bottom right jaw pain - was here Friday and given Toradol and PCN - states pain is not any better and is unable to sleep        Nurses Notes reviewed and I agree except as noted in the HPI. HISTORY OF PRESENT ILLNESS    Edda Farah is a 39 y.o. male who presents to the emergency room with concern for worsening right lower tooth pain and swelling on the right side of his face. Patient states was in the emergency room 2 days ago, started on antibiotics which she has been compliant with, has been trying to take the medication given for pain control but did not feel it was doing any better. Denies fever denies difficulty swallowing. Patient rates the pain 7 out of 10, aching. REVIEW OF SYSTEMS     Review of Systems   Constitutional: Negative for fever. HENT: Positive for dental problem and facial swelling. Negative for trouble swallowing. All other systems reviewed and are negative. PAST MEDICAL HISTORY    has a past medical history of Tooth decay. SURGICAL HISTORY      has a past surgical history that includes back surgery (9/17/2012) and Hand surgery (Right, 1995). CURRENT MEDICATIONS       Discharge Medication List as of 8/8/2021  4:00 PM      CONTINUE these medications which have NOT CHANGED    Details   ketorolac (TORADOL) 10 MG tablet Take 1 tablet by mouth every 8 hours as needed for Pain, Disp-15 tablet, R-0Normal      penicillin v potassium (VEETID) 500 MG tablet Take 1 tablet by mouth 4 times daily for 10 days, Disp-40 tablet, R-0Normal      !! ibuprofen (ADVIL;MOTRIN) 600 MG tablet Take 1 tablet by mouth 4 times daily as needed for Pain, Disp-40 tablet,R-0Print      acetaminophen (TYLENOL) 325 MG tablet Take 650 mg by mouth every 6 hours as needed for Pain.       diphenhydrAMINE (BENADRYL) 25 MG tablet Take 50 mg by mouth every 6 hours as needed for Itching. !! - Potential duplicate medications found. Please discuss with provider. ALLERGIES     is allergic to soybean-containing drug products. FAMILY HISTORY     has no family status information on file. family history is not on file. SOCIAL HISTORY      reports that he has quit smoking. He has never used smokeless tobacco. He reports that he does not drink alcohol and does not use drugs. PHYSICAL EXAM     INITIAL VITALS:  weight is 205 lb (93 kg). His oral temperature is 97.9 °F (36.6 °C). His blood pressure is 160/87 (abnormal) and his pulse is 96. His respiration is 18 and oxygen saturation is 99%. Physical Exam   Constitutional: Patient is oriented to person, place, and time. Patient appears well-developed and well-nourished. Patient is active and cooperative. HENT:   Head: Normocephalic and atraumatic. Head is without contusion. Right Ear: Hearing and external ear normal. No drainage. Left Ear: Hearing and external ear normal. No drainage. Nose: Nose normal. No nasal deformity. No epistaxis. Mouth/Throat: Mucous membranes are not dry. Numerous teeth show some decay cavitation, the right lower premolars appear to have significant decay, there is no gross abscess, pain to palpation with tongue blade. Eyes: EOMI. Conjunctivae, sclera, and lids are normal. Right eye exhibits no discharge. Left eye exhibits no discharge. Neck: Full passive range of motion without pain and phonation normal.   Cardiovascular:  Normal rate, regular rhythm and intact distal pulses. Pulses: Right radial pulse  2+   Pulmonary/Chest: Effort normal. No tachypnea and no bradypnea. Abdominal: Patient without distension  Musculoskeletal:   Negative acute trauma or deformity,  apparent full range of motion and normal strength all extremities appropriate to age. Neurological: Patient is alert and oriented to person, place, and time.  patient displays no